# Patient Record
Sex: MALE | Race: WHITE | Employment: FULL TIME | URBAN - METROPOLITAN AREA
[De-identification: names, ages, dates, MRNs, and addresses within clinical notes are randomized per-mention and may not be internally consistent; named-entity substitution may affect disease eponyms.]

---

## 2023-02-14 ENCOUNTER — OFFICE VISIT (OUTPATIENT)
Dept: NEUROLOGY | Age: 63
End: 2023-02-14
Payer: COMMERCIAL

## 2023-02-14 VITALS
HEART RATE: 74 BPM | DIASTOLIC BLOOD PRESSURE: 76 MMHG | WEIGHT: 176.7 LBS | BODY MASS INDEX: 25.3 KG/M2 | TEMPERATURE: 97.6 F | RESPIRATION RATE: 18 BRPM | HEIGHT: 70 IN | SYSTOLIC BLOOD PRESSURE: 122 MMHG | OXYGEN SATURATION: 97 %

## 2023-02-14 DIAGNOSIS — D68.51 FACTOR V LEIDEN (HCC): ICD-10-CM

## 2023-02-14 DIAGNOSIS — R41.3 MEMORY LOSS: Primary | ICD-10-CM

## 2023-02-14 DIAGNOSIS — S06.9X9S TRAUMATIC BRAIN INJURY WITH LOSS OF CONSCIOUSNESS, SEQUELA (HCC): ICD-10-CM

## 2023-02-14 PROCEDURE — 99204 OFFICE O/P NEW MOD 45 MIN: CPT | Performed by: PSYCHIATRY & NEUROLOGY

## 2023-02-14 RX ORDER — APIXABAN 5 MG/1
TABLET, FILM COATED ORAL
COMMUNITY
Start: 2022-12-23

## 2023-02-14 RX ORDER — TAMSULOSIN HYDROCHLORIDE 0.4 MG/1
CAPSULE ORAL
COMMUNITY
Start: 2023-01-23

## 2023-02-14 RX ORDER — HYDROGEN PEROXIDE 3 %
20 SOLUTION, NON-ORAL MISCELLANEOUS DAILY
COMMUNITY

## 2023-02-14 RX ORDER — SILDENAFIL 100 MG/1
100 TABLET, FILM COATED ORAL AS NEEDED
COMMUNITY

## 2023-02-14 NOTE — PROGRESS NOTES
Chief Complaint   Patient presents with    New Patient     Patient referred by Dr Clinton Parks for memory issues. Patient reports having memory issues for 50 years but has gotten worse in the last 2 years. Patient reports it is shot term and long term memory issues.  Patient states has a hard time remembering names, grandchildren names, places           Visit Vitals  /76   Pulse 74   Temp 97.6 °F (36.4 °C) (Oral)   Resp 18   Ht 5' 10\" (1.778 m)   Wt 80.2 kg (176 lb 11.2 oz)   SpO2 97%   BMI 25.35 kg/m²
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
## 2023-03-03 ENCOUNTER — HOSPITAL ENCOUNTER (OUTPATIENT)
Dept: MRI IMAGING | Age: 63
Discharge: HOME | End: 2023-03-03
Attending: PSYCHIATRY & NEUROLOGY
Payer: COMMERCIAL

## 2023-03-03 DIAGNOSIS — S06.9X9S TRAUMATIC BRAIN INJURY WITH LOSS OF CONSCIOUSNESS, SEQUELA (HCC): ICD-10-CM

## 2023-03-03 DIAGNOSIS — D68.51 FACTOR V LEIDEN (HCC): ICD-10-CM

## 2023-03-03 DIAGNOSIS — R41.3 MEMORY LOSS: ICD-10-CM

## 2023-03-03 PROCEDURE — 70551 MRI BRAIN STEM W/O DYE: CPT

## 2024-03-04 ENCOUNTER — OFFICE VISIT (OUTPATIENT)
Age: 64
End: 2024-03-04
Payer: COMMERCIAL

## 2024-03-04 ENCOUNTER — TELEPHONE (OUTPATIENT)
Age: 64
End: 2024-03-04

## 2024-03-04 VITALS
HEART RATE: 64 BPM | DIASTOLIC BLOOD PRESSURE: 80 MMHG | SYSTOLIC BLOOD PRESSURE: 130 MMHG | BODY MASS INDEX: 25.2 KG/M2 | HEIGHT: 70 IN | OXYGEN SATURATION: 97 % | WEIGHT: 176 LBS

## 2024-03-04 DIAGNOSIS — R53.83 OTHER FATIGUE: ICD-10-CM

## 2024-03-04 DIAGNOSIS — R00.1 BRADYCARDIA: Primary | ICD-10-CM

## 2024-03-04 DIAGNOSIS — R55 NEAR SYNCOPE: ICD-10-CM

## 2024-03-04 DIAGNOSIS — Z76.89 ENCOUNTER TO ESTABLISH CARE: ICD-10-CM

## 2024-03-04 PROCEDURE — 99204 OFFICE O/P NEW MOD 45 MIN: CPT | Performed by: INTERNAL MEDICINE

## 2024-03-04 PROCEDURE — 93000 ELECTROCARDIOGRAM COMPLETE: CPT | Performed by: INTERNAL MEDICINE

## 2024-03-04 RX ORDER — BUSPIRONE HYDROCHLORIDE 10 MG/1
10 TABLET ORAL 3 TIMES DAILY
COMMUNITY

## 2024-03-04 RX ORDER — DULOXETIN HYDROCHLORIDE 30 MG/1
30 CAPSULE, DELAYED RELEASE ORAL DAILY
COMMUNITY

## 2024-03-04 NOTE — PROGRESS NOTES
Patient: Janes Mary  : 1960    Primary Cardiologist: Delia Marcos MD  EP Cardiologist:  PCP: Minh Daley MD    Today's Date: 3/4/2024      ASSESSMENT AND PLAN:     Assessment and Plan:  Near syncope, bradycardia  EKG SB HR 50 today  7 day holter  Echo  ETT    2. 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 HLD  Labs requested    4.  Negrete's esophagus  Hx ruptures esophagus in 1970s    Follow up after above testing.        ICD-10-CM    1. Encounter to establish care  Z76.89 EKG 12 Lead          HISTORY OF PRESENT ILLNESS:     History of Present Illness:  Janes Mary is a 63 y.o. male here fo lightheadedness, dizziness, fatigue.    ++ LH dizzy fatigue    Positional change get woozy, tunnel vision.    ++ Exertional SOB, sometime chest pressure.    CCS  - .    EKG SH HR 40 today.    Works in [employer], wear suits, can get hot.  Gets down low and programs, overhead work.    Denies chest pain, edema, syncope, shortness of breath at rest, dyspnea on exertion, PND or orthopnea.  Has no tachycardia, palpitations or sense of arrhythmia.     PAST MEDICAL HISTORY:     Past Medical History:   Diagnosis Date    DVT, lower extremity ([facility])     Gastrointestinal disorder     gerd    Thromboembolus ([facility])         Past Surgical History:   Procedure Laterality Date    ESOPHAGECTOMY      rupturedd        CURRENT MEDICATIONS:    .  Current Outpatient Medications   Medication Sig Dispense Refill    EZETIMIBE-ROSUVASTATIN PO Take by mouth      busPIRone (BUSPAR) 10 MG tablet Take 1 tablet by mouth 3 times daily      DULoxetine (CYMBALTA) 30 MG extended release capsule Take 1 capsule by mouth daily      apixaban (ELIQUIS) 5 MG TABS tablet TAKE 1 TABLET BY MOUTH TWICE A DAY FOR 90 DAYS      esomeprazole (NEXIUM) 20 MG delayed release capsule Take 1 capsule by mouth daily      sildenafil (VIAGRA) 100 MG tablet Take 1 tablet by mouth as needed      tamsulosin (FLOMAX) 0.4 MG capsule

## 2024-03-04 NOTE — PROGRESS NOTES
Chief Complaint   Patient presents with    New Patient     Vitals:    03/04/24 1256   BP: 130/80   Site: Left Upper Arm   Position: Sitting   Cuff Size: Medium Adult   Pulse: 64   SpO2: 97%   Weight: 79.8 kg (176 lb)   Height: 1.778 m (5' 10\")      /80 (Site: Left Upper Arm, Position: Sitting, Cuff Size: Medium Adult)   Pulse 64   Ht 1.778 m (5' 10\")   Wt 79.8 kg (176 lb)   SpO2 97%   BMI 25.25 kg/m²        Chief Complaint   Patient presents with    New Patient     There were no vitals filed for this visit.   There were no vitals taken for this visit.

## 2024-03-04 NOTE — TELEPHONE ENCOUNTER
Enrolled with [employer] - Ordered and being shipped to patient's home address on file.  ETA within 5-7 business days.        holter  Received: Today  Yanelis Ledbetter LPN Baker, Jami; Sosa Mathias  7 day holter for eliseo/near syncope per Dr. Marcos.

## 2024-03-07 ENCOUNTER — TELEPHONE (OUTPATIENT)
Age: 64
End: 2024-03-07

## 2024-05-08 ENCOUNTER — OFFICE VISIT (OUTPATIENT)
Age: 64
End: 2024-05-08
Payer: COMMERCIAL

## 2024-05-08 VITALS
SYSTOLIC BLOOD PRESSURE: 122 MMHG | HEIGHT: 70 IN | HEART RATE: 87 BPM | WEIGHT: 176 LBS | BODY MASS INDEX: 25.2 KG/M2 | DIASTOLIC BLOOD PRESSURE: 78 MMHG | OXYGEN SATURATION: 98 %

## 2024-05-08 DIAGNOSIS — I82.4Z9 DEEP VEIN THROMBOSIS (DVT) OF DISTAL VEIN OF LOWER EXTREMITY, UNSPECIFIED CHRONICITY, UNSPECIFIED LATERALITY (HCC): ICD-10-CM

## 2024-05-08 DIAGNOSIS — R55 NEAR SYNCOPE: Primary | ICD-10-CM

## 2024-05-08 DIAGNOSIS — R53.83 OTHER FATIGUE: ICD-10-CM

## 2024-05-08 DIAGNOSIS — D68.51 FACTOR V LEIDEN (HCC): ICD-10-CM

## 2024-05-08 PROBLEM — I82.409 DVT, LOWER EXTREMITY (HCC): Status: ACTIVE | Noted: 2024-05-08

## 2024-05-08 PROCEDURE — 99214 OFFICE O/P EST MOD 30 MIN: CPT | Performed by: INTERNAL MEDICINE

## 2024-05-08 NOTE — PROGRESS NOTES
Patient: Janes Mary  : 1960    Primary Cardiologist: Delia Marcos MD  EP Cardiologist:  PCP: Minh Daley MD    Today's Date: 2024    Feels well.  Less dizzy. Does not sleep well.  Up a lot.      ASSESSMENT AND PLAN:     Assessment and Plan:  Near syncope, bradycardia  EKG SB HR 50 today  7 day holter NSR AVG HR 72  Echo normal LV fxn, mildly dilated Ao ascending diameter is 3.6 cm.   ETT 8 min stage 3 max  no ST changes    2. Factor V Leiden deficiency, recurrent DVT on lifelong eliquis    3. HLD  Labs requested  [facility] 2022 153    4.  Negrete's esophagus  Hx ruptures esophagus in 1970s    Follow up as needed.        ICD-10-CM    1. Near syncope  R55       2. Other fatigue  R53.83       3. Deep vein thrombosis (DVT) of distal vein of lower extremity, unspecified chronicity, unspecified laterality ([facility])  I82.4Z9       4. Factor V Leiden ([facility])  D68.51             HISTORY OF PRESENT ILLNESS:     History of Present Illness:  Janes Mary is a 63 y.o. male here fo lightheadedness, dizziness, fatigue.    ++ LH dizzy fatigue    Positional change get woozy, tunnel vision.    ++ Exertional SOB, sometime chest pressure.    CCS  - .    EKG SH HR 40 today.    Works in [employer], wear suits, can get hot.  Gets down low and programs, overhead work.    Denies chest pain, edema, syncope, shortness of breath at rest, dyspnea on exertion, PND or orthopnea.  Has no tachycardia, palpitations or sense of arrhythmia.     PAST MEDICAL HISTORY:     Past Medical History:   Diagnosis Date    DVT, lower extremity ([facility])     Gastrointestinal disorder     gerd    Thromboembolus ([facility])         Past Surgical History:   Procedure Laterality Date    ESOPHAGECTOMY      rupturedd        CURRENT MEDICATIONS:    .  Current Outpatient Medications   Medication Sig Dispense Refill    EZETIMIBE-ROSUVASTATIN PO Take by mouth      apixaban (ELIQUIS) 5 MG TABS tablet TAKE 1 TABLET BY MOUTH TWICE A DAY FOR 90

## 2024-05-08 NOTE — PROGRESS NOTES
Chief Complaint   Patient presents with    Fatigue    Loss of Consciousness    Other     CHRISTIANO     Vitals:    05/08/24 1316   BP: 122/78   Site: Left Upper Arm   Position: Sitting   Pulse: 87   SpO2: 98%   Weight: 79.8 kg (176 lb)   Height: 1.778 m (5' 10\")     Chest pain: DENIED     Recent hospital stays: DENIED     Refills: DENIED

## 2024-07-10 ENCOUNTER — TELEPHONE (OUTPATIENT)
Age: 64
End: 2024-07-10

## 2024-07-10 NOTE — TELEPHONE ENCOUNTER
Patient would like a call back ASAP to discuss stopping Eliquis for an upcoming surgery. Pre admission is scheduled for 07/17/2024.     Patient Phone # [phone]

## 2024-07-10 NOTE — TELEPHONE ENCOUNTER
Surgery scheduled for [facility]   Chip PAT on 7/17   Surgery planned for 7/23 hernia repair  On Eliquis DAILY Not BID

## 2024-08-29 ENCOUNTER — CLINICAL DOCUMENTATION (OUTPATIENT)
Age: 64
End: 2024-08-29

## 2024-08-29 NOTE — PROGRESS NOTES
Received fax request for records from [employer] for holter due to insurance.  Faxed copy of OV 3/4/24 Dr Marcos, EKG 3/4/24, echo 3/25/24 & stress 3/25/24.  Faxed records to fax # [phone].  Received confirmation.

## 2025-07-21 ENCOUNTER — TELEPHONE (OUTPATIENT)
Age: 65
End: 2025-07-21